# Patient Record
Sex: FEMALE | Race: WHITE
[De-identification: names, ages, dates, MRNs, and addresses within clinical notes are randomized per-mention and may not be internally consistent; named-entity substitution may affect disease eponyms.]

---

## 2018-01-26 ENCOUNTER — HOSPITAL ENCOUNTER (OUTPATIENT)
Dept: HOSPITAL 35 - CATH | Age: 43
Discharge: HOME | End: 2018-01-26
Attending: INTERNAL MEDICINE
Payer: COMMERCIAL

## 2018-01-26 VITALS — DIASTOLIC BLOOD PRESSURE: 66 MMHG | SYSTOLIC BLOOD PRESSURE: 100 MMHG

## 2018-01-26 VITALS — HEIGHT: 61 IN | BODY MASS INDEX: 33.04 KG/M2 | WEIGHT: 175 LBS

## 2018-01-26 DIAGNOSIS — Z98.84: ICD-10-CM

## 2018-01-26 DIAGNOSIS — F17.210: ICD-10-CM

## 2018-01-26 DIAGNOSIS — Z90.710: ICD-10-CM

## 2018-01-26 DIAGNOSIS — F41.8: ICD-10-CM

## 2018-01-26 DIAGNOSIS — G47.33: ICD-10-CM

## 2018-01-26 DIAGNOSIS — Z98.890: ICD-10-CM

## 2018-01-26 DIAGNOSIS — Z90.49: ICD-10-CM

## 2018-01-26 DIAGNOSIS — Z79.899: ICD-10-CM

## 2018-01-26 DIAGNOSIS — R94.39: Primary | ICD-10-CM

## 2018-01-26 DIAGNOSIS — Z88.8: ICD-10-CM

## 2018-01-26 LAB
ANION GAP SERPL CALC-SCNC: 8 MMOL/L (ref 7–16)
BUN SERPL-MCNC: 15 MG/DL (ref 7–18)
CALCIUM SERPL-MCNC: 8.9 MG/DL (ref 8.5–10.1)
CHLORIDE SERPL-SCNC: 108 MMOL/L (ref 98–107)
CO2 SERPL-SCNC: 29 MMOL/L (ref 21–32)
CREAT SERPL-MCNC: 0.7 MG/DL (ref 0.6–1)
ERYTHROCYTE [DISTWIDTH] IN BLOOD BY AUTOMATED COUNT: 13 % (ref 10.5–14.5)
GLUCOSE SERPL-MCNC: 86 MG/DL (ref 74–106)
HCT VFR BLD CALC: 33.1 % (ref 37–47)
HGB BLD-MCNC: 11.3 GM/DL (ref 12–15)
INR PPP: 1
MCH RBC QN AUTO: 32.3 PG (ref 26–34)
MCHC RBC AUTO-ENTMCNC: 34.1 G/DL (ref 28–37)
MCV RBC: 94.6 FL (ref 80–100)
PLATELET # BLD: 238 THOU/UL (ref 150–400)
POTASSIUM SERPL-SCNC: 3.6 MMOL/L (ref 3.5–5.1)
PROTHROMBIN TIME: 10.2 SECONDS (ref 9.3–11.4)
RBC # BLD AUTO: 3.5 MIL/UL (ref 4.2–5)
SODIUM SERPL-SCNC: 145 MMOL/L (ref 136–145)
WBC # BLD AUTO: 4.5 THOU/UL (ref 4–11)

## 2018-06-18 ENCOUNTER — HOSPITAL ENCOUNTER (EMERGENCY)
Dept: HOSPITAL 96 - M.ERS | Age: 43
LOS: 1 days | Discharge: HOME | End: 2018-06-19
Payer: COMMERCIAL

## 2018-06-18 VITALS — HEIGHT: 61 IN | WEIGHT: 170 LBS | BODY MASS INDEX: 32.1 KG/M2

## 2018-06-18 DIAGNOSIS — Z88.8: ICD-10-CM

## 2018-06-18 DIAGNOSIS — G47.30: ICD-10-CM

## 2018-06-18 DIAGNOSIS — L29.9: Primary | ICD-10-CM

## 2018-06-18 DIAGNOSIS — T78.40XA: ICD-10-CM

## 2018-06-18 DIAGNOSIS — M79.7: ICD-10-CM
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
## 2018-06-18 DIAGNOSIS — Z90.710: ICD-10-CM

## 2018-06-18 DIAGNOSIS — Z90.49: ICD-10-CM

## 2018-06-19 VITALS — DIASTOLIC BLOOD PRESSURE: 68 MMHG | SYSTOLIC BLOOD PRESSURE: 106 MMHG

## 2018-07-25 ENCOUNTER — HOSPITAL ENCOUNTER (OUTPATIENT)
Dept: HOSPITAL 96 - M.RAD | Age: 43
End: 2018-07-25
Attending: FAMILY MEDICINE
Payer: COMMERCIAL

## 2018-07-25 DIAGNOSIS — Z12.31: Primary | ICD-10-CM

## 2019-01-21 ENCOUNTER — HOSPITAL ENCOUNTER (OUTPATIENT)
Dept: HOSPITAL 96 - M.RAD | Age: 44
End: 2019-01-21
Attending: NEUROLOGICAL SURGERY
Payer: COMMERCIAL

## 2019-01-21 DIAGNOSIS — F41.9: ICD-10-CM

## 2019-01-21 DIAGNOSIS — Z88.8: ICD-10-CM

## 2019-01-21 DIAGNOSIS — G93.2: Primary | ICD-10-CM

## 2019-01-21 DIAGNOSIS — Z79.899: ICD-10-CM

## 2019-01-21 LAB
ALBUMIN SERPL-MCNC: 5.1 G/DL (ref 3.5–5.5)
CLARITY CSF: CLEAR
COLOR CSF: COLORLESS
CSF RBC: 1 /MM3
CSF WBC: 0 /MM3 (ref 0–10)
GLUCOSE CSF-MCNC: 54 MG/DL (ref 40–70)
PROT CSF-MCNC: 32.4 MG/DL (ref 15–45)
SPECIMEN VOL 24H UR: 8 ML

## 2019-01-23 LAB
ALB CSF/SERPL: 3 {RATIO} (ref 0–8)
ALBUMIN CSF-MCNC: 15 MG/DL (ref 11–48)
IGG/ALB CLEAR SER+CSF-RTO: 0.6 (ref 0–0.7)
MBP CSF-MCNC: 2.6 NG/ML (ref 0–1.2)

## 2021-06-13 ENCOUNTER — HOSPITAL ENCOUNTER (EMERGENCY)
Dept: HOSPITAL 96 - M.ERS | Age: 46
Discharge: HOME | End: 2021-06-13
Payer: COMMERCIAL

## 2021-06-13 VITALS — SYSTOLIC BLOOD PRESSURE: 116 MMHG | DIASTOLIC BLOOD PRESSURE: 73 MMHG

## 2021-06-13 VITALS — WEIGHT: 240 LBS | BODY MASS INDEX: 45.31 KG/M2 | HEIGHT: 61 IN

## 2021-06-13 DIAGNOSIS — R51.9: Primary | ICD-10-CM

## 2021-06-13 DIAGNOSIS — M79.7: ICD-10-CM

## 2021-06-13 DIAGNOSIS — G47.30: ICD-10-CM

## 2021-06-13 DIAGNOSIS — Z90.49: ICD-10-CM

## 2021-06-13 DIAGNOSIS — Z90.710: ICD-10-CM

## 2021-06-13 DIAGNOSIS — I10: ICD-10-CM

## 2021-06-13 DIAGNOSIS — Z88.6: ICD-10-CM

## 2021-06-13 LAB
ABSOLUTE BASOPHILS: 0 THOU/UL (ref 0–0.2)
ABSOLUTE EOSINOPHILS: 0 THOU/UL (ref 0–0.7)
ABSOLUTE MONOCYTES: 0.6 THOU/UL (ref 0–1.2)
ALBUMIN SERPL-MCNC: 4.1 G/DL (ref 3.4–5)
ALP SERPL-CCNC: 79 U/L (ref 46–116)
ALT SERPL-CCNC: 21 U/L (ref 30–65)
ANION GAP SERPL CALC-SCNC: 7 MMOL/L (ref 7–16)
AST SERPL-CCNC: 18 U/L (ref 15–37)
BASOPHILS NFR BLD AUTO: 0.6 %
BILIRUB SERPL-MCNC: 0.2 MG/DL
BUN SERPL-MCNC: 16 MG/DL (ref 7–18)
CALCIUM SERPL-MCNC: 8.6 MG/DL (ref 8.5–10.1)
CHLORIDE SERPL-SCNC: 100 MMOL/L (ref 98–107)
CO2 SERPL-SCNC: 28 MMOL/L (ref 21–32)
CREAT SERPL-MCNC: 1.2 MG/DL (ref 0.6–1.3)
EOSINOPHIL NFR BLD: 0.8 %
GLUCOSE SERPL-MCNC: 149 MG/DL (ref 70–99)
GRANULOCYTES NFR BLD MANUAL: 73.1 %
HCT VFR BLD CALC: 37.3 % (ref 37–47)
HGB BLD-MCNC: 12.5 GM/DL (ref 12–15)
LYMPHOCYTES # BLD: 0.7 THOU/UL (ref 0.8–5.3)
LYMPHOCYTES NFR BLD AUTO: 12.9 %
MCH RBC QN AUTO: 30.9 PG (ref 26–34)
MCHC RBC AUTO-ENTMCNC: 33.6 G/DL (ref 28–37)
MCV RBC: 92.1 FL (ref 80–100)
MONOCYTES NFR BLD: 12.6 %
MPV: 8.1 FL. (ref 7.2–11.1)
NEUTROPHILS # BLD: 3.7 THOU/UL (ref 1.6–8.1)
NUCLEATED RBCS: 0 /100WBC
PLATELET COUNT*: 239 THOU/UL (ref 150–400)
POTASSIUM SERPL-SCNC: 4 MMOL/L (ref 3.5–5.1)
PROT SERPL-MCNC: 7.8 G/DL (ref 6.4–8.2)
RBC # BLD AUTO: 4.05 MIL/UL (ref 4.2–5)
RDW-CV: 14.8 % (ref 10.5–14.5)
SODIUM SERPL-SCNC: 135 MMOL/L (ref 136–145)
WBC # BLD AUTO: 5.1 THOU/UL (ref 4–11)

## 2021-08-31 ENCOUNTER — HOSPITAL ENCOUNTER (EMERGENCY)
Dept: HOSPITAL 96 - M.ERS | Age: 46
Discharge: HOME | End: 2021-08-31
Payer: COMMERCIAL

## 2021-08-31 VITALS — BODY MASS INDEX: 43.43 KG/M2 | WEIGHT: 230.01 LBS | HEIGHT: 61 IN

## 2021-08-31 VITALS — SYSTOLIC BLOOD PRESSURE: 120 MMHG | DIASTOLIC BLOOD PRESSURE: 87 MMHG

## 2021-08-31 DIAGNOSIS — H53.2: ICD-10-CM

## 2021-08-31 DIAGNOSIS — Z98.84: ICD-10-CM

## 2021-08-31 DIAGNOSIS — M79.7: ICD-10-CM

## 2021-08-31 DIAGNOSIS — Z90.49: ICD-10-CM

## 2021-08-31 DIAGNOSIS — Z79.899: ICD-10-CM

## 2021-08-31 DIAGNOSIS — G43.909: ICD-10-CM

## 2021-08-31 DIAGNOSIS — R20.0: ICD-10-CM

## 2021-08-31 DIAGNOSIS — Z90.711: ICD-10-CM

## 2021-08-31 DIAGNOSIS — R42: Primary | ICD-10-CM

## 2021-08-31 DIAGNOSIS — H92.02: ICD-10-CM

## 2021-08-31 DIAGNOSIS — Z88.6: ICD-10-CM

## 2021-08-31 LAB
ABSOLUTE BASOPHILS: 0.1 THOU/UL (ref 0–0.2)
ABSOLUTE EOSINOPHILS: 0.1 THOU/UL (ref 0–0.7)
ABSOLUTE MONOCYTES: 0.5 THOU/UL (ref 0–1.2)
ANION GAP SERPL CALC-SCNC: 7 MMOL/L (ref 7–16)
BASOPHILS NFR BLD AUTO: 1.3 %
BILIRUB UR-MCNC: NEGATIVE MG/DL
BUN SERPL-MCNC: 14 MG/DL (ref 7–18)
CALCIUM SERPL-MCNC: 9.3 MG/DL (ref 8.5–10.1)
CHLORIDE SERPL-SCNC: 103 MMOL/L (ref 98–107)
CO2 SERPL-SCNC: 29 MMOL/L (ref 21–32)
COLOR UR: YELLOW
CREAT SERPL-MCNC: 0.9 MG/DL (ref 0.6–1.3)
EOSINOPHIL NFR BLD: 2.8 %
GLUCOSE SERPL-MCNC: 86 MG/DL (ref 70–99)
GRANULOCYTES NFR BLD MANUAL: 43.5 %
HCT VFR BLD CALC: 40.9 % (ref 37–47)
HGB BLD-MCNC: 13.5 GM/DL (ref 12–15)
KETONES UR STRIP-MCNC: NEGATIVE MG/DL
LYMPHOCYTES # BLD: 2.2 THOU/UL (ref 0.8–5.3)
LYMPHOCYTES NFR BLD AUTO: 42.6 %
MCH RBC QN AUTO: 30.6 PG (ref 26–34)
MCHC RBC AUTO-ENTMCNC: 33 G/DL (ref 28–37)
MCV RBC: 92.8 FL (ref 80–100)
MONOCYTES NFR BLD: 9.8 %
MPV: 7.8 FL. (ref 7.2–11.1)
NEUTROPHILS # BLD: 2.3 THOU/UL (ref 1.6–8.1)
NITRITE UR QL STRIP: NEGATIVE
NUCLEATED RBCS: 0 /100WBC
PLATELET COUNT*: 309 THOU/UL (ref 150–400)
POTASSIUM SERPL-SCNC: 4.6 MMOL/L (ref 3.5–5.1)
PROT UR QL STRIP: NEGATIVE
RBC # BLD AUTO: 4.41 MIL/UL (ref 4.2–5)
RBC # UR STRIP: NEGATIVE /UL
RDW-CV: 14.4 % (ref 10.5–14.5)
SODIUM SERPL-SCNC: 139 MMOL/L (ref 136–145)
SP GR UR STRIP: 1.02 (ref 1–1.03)
URINE CLARITY: CLEAR
URINE GLUCOSE-RANDOM: NEGATIVE
URINE LEUKOCYTES: NEGATIVE
UROBILINOGEN UR STRIP-ACNC: 0.2 E.U./DL (ref 0.2–1)
WBC # BLD AUTO: 5.2 THOU/UL (ref 4–11)

## 2021-08-31 NOTE — EKG
[place] [address]
Phone:  [phone]                     ELECTROCARDIOGRAM REPORT      
_______________________________________________________________________________
 
Name:         EMILI IYER              Room:                     [facility] 
M.R.#:    [id number]     Account #:     [id number]  
Admission:    21    Attend Phys:                     
Discharge:                Date of Birth: [birth year]  
Date of Service: 21 1409  Report #:      [id number]
        [id number]
_______________________________________________________________________________
THIS REPORT FOR:  //name//                      
 
                         [facility] ED
                                       
Test Date:    2021               Test Time:    14:09:17
Pat Name:     EMILI IYER           Department:   
Patient ID:   [id number]            Room:          
Gender:       F                        Technician:   LUCAS
:          1975               Requested By: Pradip Denis
Order Number: [id number] MD:   Kyler Castillo
                                 Measurements
Intervals                              Axis          
Rate:         49                       P:            39
MO:           148                      QRS:          13
QRSD:         105                      T:            -1
QT:           472                                    
QTc:          427                                    
                           Interpretive Statements
Sinus bradycardia
Borderline repolarization abnormality
Baseline wander in lead(s) II,III,aVL,aVF,V1
Compared to ECG 2017 00:05:32
Sinus rhythm no longer present
Electronically Signed On 2021 14:26:51 CDT by Kyler Castillo
[email or web address]
 
 
 
 
 
 
 
 
 
 
 
 
 
 
 
 
 
 
 
  <ELECTRONICALLY SIGNED>
                                           By: Kyler Castillo MD, [facility]      
  21     1426
D: 21 1409   _____________________________________
T: 21 1409   Kyler Castillo MD, [facility]        /EPI